# Patient Record
Sex: FEMALE | Race: ASIAN | NOT HISPANIC OR LATINO
[De-identification: names, ages, dates, MRNs, and addresses within clinical notes are randomized per-mention and may not be internally consistent; named-entity substitution may affect disease eponyms.]

---

## 2020-04-26 ENCOUNTER — MESSAGE (OUTPATIENT)
Age: 25
End: 2020-04-26

## 2020-05-14 LAB
SARS-COV-2 IGG SERPL IA-ACNC: 0.1 INDEX
SARS-COV-2 IGG SERPL QL IA: NEGATIVE

## 2022-01-31 ENCOUNTER — RESULT REVIEW (OUTPATIENT)
Age: 27
End: 2022-01-31

## 2022-04-26 PROBLEM — Z00.00 ENCOUNTER FOR PREVENTIVE HEALTH EXAMINATION: Status: ACTIVE | Noted: 2022-04-26

## 2022-04-27 ENCOUNTER — ASOB RESULT (OUTPATIENT)
Age: 27
End: 2022-04-27

## 2022-04-27 ENCOUNTER — APPOINTMENT (OUTPATIENT)
Dept: ANTEPARTUM | Facility: CLINIC | Age: 27
End: 2022-04-27
Payer: COMMERCIAL

## 2022-04-27 PROCEDURE — 76811 OB US DETAILED SNGL FETUS: CPT

## 2022-08-23 ENCOUNTER — OUTPATIENT (OUTPATIENT)
Dept: OUTPATIENT SERVICES | Facility: HOSPITAL | Age: 27
LOS: 1 days | End: 2022-08-23
Payer: COMMERCIAL

## 2022-08-23 VITALS
OXYGEN SATURATION: 98 % | HEART RATE: 88 BPM | SYSTOLIC BLOOD PRESSURE: 104 MMHG | WEIGHT: 127.87 LBS | DIASTOLIC BLOOD PRESSURE: 72 MMHG | HEIGHT: 64 IN | RESPIRATION RATE: 16 BRPM | TEMPERATURE: 98 F

## 2022-08-23 DIAGNOSIS — Z01.818 ENCOUNTER FOR OTHER PREPROCEDURAL EXAMINATION: ICD-10-CM

## 2022-08-23 DIAGNOSIS — Z34.90 ENCOUNTER FOR SUPERVISION OF NORMAL PREGNANCY, UNSPECIFIED, UNSPECIFIED TRIMESTER: ICD-10-CM

## 2022-08-23 DIAGNOSIS — Z33.1 PREGNANT STATE, INCIDENTAL: ICD-10-CM

## 2022-08-23 DIAGNOSIS — Z29.9 ENCOUNTER FOR PROPHYLACTIC MEASURES, UNSPECIFIED: ICD-10-CM

## 2022-08-23 LAB
HCT VFR BLD CALC: 41.6 % — SIGNIFICANT CHANGE UP (ref 34.5–45)
HGB BLD-MCNC: 13.8 G/DL — SIGNIFICANT CHANGE UP (ref 11.5–15.5)
MCHC RBC-ENTMCNC: 30.9 PG — SIGNIFICANT CHANGE UP (ref 27–34)
MCHC RBC-ENTMCNC: 33.2 GM/DL — SIGNIFICANT CHANGE UP (ref 32–36)
MCV RBC AUTO: 93.1 FL — SIGNIFICANT CHANGE UP (ref 80–100)
NRBC # BLD: 0 /100 WBCS — SIGNIFICANT CHANGE UP (ref 0–0)
PLATELET # BLD AUTO: 206 K/UL — SIGNIFICANT CHANGE UP (ref 150–400)
RBC # BLD: 4.47 M/UL — SIGNIFICANT CHANGE UP (ref 3.8–5.2)
RBC # FLD: 13.4 % — SIGNIFICANT CHANGE UP (ref 10.3–14.5)
RH IG SCN BLD-IMP: POSITIVE — SIGNIFICANT CHANGE UP
WBC # BLD: 9.3 K/UL — SIGNIFICANT CHANGE UP (ref 3.8–10.5)
WBC # FLD AUTO: 9.3 K/UL — SIGNIFICANT CHANGE UP (ref 3.8–10.5)

## 2022-08-23 PROCEDURE — 86850 RBC ANTIBODY SCREEN: CPT

## 2022-08-23 PROCEDURE — 86900 BLOOD TYPING SEROLOGIC ABO: CPT

## 2022-08-23 PROCEDURE — 36415 COLL VENOUS BLD VENIPUNCTURE: CPT

## 2022-08-23 PROCEDURE — G0463: CPT

## 2022-08-23 PROCEDURE — 85027 COMPLETE CBC AUTOMATED: CPT

## 2022-08-23 PROCEDURE — 86901 BLOOD TYPING SEROLOGIC RH(D): CPT

## 2022-08-23 NOTE — OB PST NOTE - NSANTHOSAYNRD_GEN_A_CORE
No. CHIO screening performed.  STOP BANG Legend: 0-2 = LOW Risk; 3-4 = INTERMEDIATE Risk; 5-8 = HIGH Risk

## 2022-08-23 NOTE — OB PST NOTE - NSHPPHYSICALEXAM_GEN_ALL_CORE
Constitutional: well developed, well nourished,  and no acute distress    Neurological: Alert & Oriented x 3,  no focal deficits    HEENT:   Neck supple.    Respiratory: CTA B/L, No wheezing/crackles/rhonchi    Cardiovascular: (+) S1 & S2, RRR    Gastrointestinal:  (+) BS    Genitourinary: non distended bladder, voiding freely    Extremities: No pedal edema    Skin:  normal skin color

## 2022-08-23 NOTE — OB PST NOTE - ASSESSMENT
BRAULIOI VTE 2.0 SCORE [CLOT updated 2019]    AGE RELATED RISK FACTORS                                                       MOBILITY RELATED FACTORS  [ ] Age 41-60 years                                            (1 Point)                    [ ] Bed rest                                                        (1 Point)  [ ] Age: 61-74 years                                           (2 Points)                  [ ] Plaster cast                                                   (2 Points)  [ ] Age= 75 years                                              (3 Points)                    [ ] Bed bound for more than 72 hours                 (2 Points)    DISEASE RELATED RISK FACTORS                                               GENDER SPECIFIC FACTORS  [ ] Edema in the lower extremities                       (1 Point)              [ x] Pregnancy                                                     (1 Point)  [ ] Varicose veins                                               (1 Point)                     [ ] Post-partum < 6 weeks                                   (1 Point)             [ ] BMI > 25 Kg/m2                                            (1 Point)                     [ ] Hormonal therapy  or oral contraception          (1 Point)                 [ ] Sepsis (in the previous month)                        (1 Point)               [ ] History of pregnancy complications                 (1 point)  [ ] Pneumonia or serious lung disease                                               [ ] Unexplained or recurrent                     (1 Point)           (in the previous month)                               (1 Point)  [ ] Abnormal pulmonary function test                     (1 Point)                 SURGERY RELATED RISK FACTORS  [ ] Acute myocardial infarction                              (1 Point)               [ x]  Section                                             (1 Point)  [ ] Congestive heart failure (in the previous month)  (1 Point)      [ ] Minor surgery                                                  (1 Point)   [ ] Inflammatory bowel disease                             (1 Point)               [ ] Arthroscopic surgery                                        (2 Points)  [ ] Central venous access                                      (2 Points)                [ ] General surgery lasting more than 45 minutes (2 points)  [ ] Malignancy- Present or previous                   (2 Points)                [ ] Elective arthroplasty                                         (5 points)    [ ] Stroke (in the previous month)                          (5 Points)                                                                                                                                                           HEMATOLOGY RELATED FACTORS                                                 TRAUMA RELATED RISK FACTORS  [ ] Prior episodes of VTE                                     (3 Points)                [ ] Fracture of the hip, pelvis, or leg                       (5 Points)  [ ] Positive family history for VTE                         (3 Points)             [ ] Acute spinal cord injury (in the previous month)  (5 Points)  [ ] Prothrombin 69141 A                                     (3 Points)               [ ] Paralysis  (less than 1 month)                             (5 Points)  [ ] Factor V Leiden                                             (3 Points)                  [ ] Multiple Trauma within 1 month                        (5 Points)  [ ] Lupus anticoagulants                                     (3 Points)                                                           [ ] Anticardiolipin antibodies                               (3 Points)                                                       [ ] High homocysteine in the blood                      (3 Points)                                             [ ] Other congenital or acquired thrombophilia      (3 Points)                                                [ ] Heparin induced thrombocytopenia                  (3 Points)                                     Total Score [  2        ]

## 2022-08-23 NOTE — OB PST NOTE - FALL HARM RISK - UNIVERSAL INTERVENTIONS
Bed in lowest position, wheels locked, appropriate side rails in place/Call bell, personal items and telephone in reach/Instruct patient to call for assistance before getting out of bed or chair/Non-slip footwear when patient is out of bed/[place] to call system/Physically safe environment - no spills, clutter or unnecessary equipment/Purposeful Proactive Rounding/Room/bathroom lighting operational, light cord in reach

## 2022-08-23 NOTE — OB PST NOTE - HISTORY OF PRESENT ILLNESS
27 year old female  term pregnancy with breech presentation planned for Primary  on 2022 ( EDC 2022)      ***Covid test 9/3 Near home  Covid 2022 mild symptoms, follow Dr. Joseph covid swab instructions   patient will bring pharmacy info the DOS

## 2022-08-24 LAB — BLD GP AB SCN SERPL QL: NEGATIVE — SIGNIFICANT CHANGE UP

## 2022-08-26 PROBLEM — Z34.90 ENCOUNTER FOR SUPERVISION OF NORMAL PREGNANCY, UNSPECIFIED, UNSPECIFIED TRIMESTER: Chronic | Status: ACTIVE | Noted: 2022-08-23

## 2022-09-02 ENCOUNTER — TRANSCRIPTION ENCOUNTER (OUTPATIENT)
Age: 27
End: 2022-09-02

## 2022-09-03 ENCOUNTER — INPATIENT (INPATIENT)
Facility: HOSPITAL | Age: 27
LOS: 1 days | Discharge: HOME | End: 2022-09-05
Attending: OBSTETRICS & GYNECOLOGY | Admitting: OBSTETRICS & GYNECOLOGY
Payer: COMMERCIAL

## 2022-09-03 VITALS
HEART RATE: 80 BPM | RESPIRATION RATE: 17 BRPM | SYSTOLIC BLOOD PRESSURE: 140 MMHG | TEMPERATURE: 98 F | OXYGEN SATURATION: 99 % | DIASTOLIC BLOOD PRESSURE: 83 MMHG

## 2022-09-03 DIAGNOSIS — O26.899 OTHER SPECIFIED PREGNANCY RELATED CONDITIONS, UNSPECIFIED TRIMESTER: ICD-10-CM

## 2022-09-03 DIAGNOSIS — Z3A.00 WEEKS OF GESTATION OF PREGNANCY NOT SPECIFIED: ICD-10-CM

## 2022-09-03 DIAGNOSIS — Z34.80 ENCOUNTER FOR SUPERVISION OF OTHER NORMAL PREGNANCY, UNSPECIFIED TRIMESTER: ICD-10-CM

## 2022-09-03 LAB
ALBUMIN SERPL ELPH-MCNC: 4 G/DL — SIGNIFICANT CHANGE UP (ref 3.3–5)
ALP SERPL-CCNC: 143 U/L — HIGH (ref 40–120)
ALT FLD-CCNC: 14 U/L — SIGNIFICANT CHANGE UP (ref 10–45)
ANION GAP SERPL CALC-SCNC: 12 MMOL/L — SIGNIFICANT CHANGE UP (ref 5–17)
APTT BLD: 25.6 SEC — LOW (ref 27.5–35.5)
AST SERPL-CCNC: 15 U/L — SIGNIFICANT CHANGE UP (ref 10–40)
BASOPHILS # BLD AUTO: 0.08 K/UL — SIGNIFICANT CHANGE UP (ref 0–0.2)
BASOPHILS NFR BLD AUTO: 0.8 % — SIGNIFICANT CHANGE UP (ref 0–2)
BILIRUB SERPL-MCNC: 0.4 MG/DL — SIGNIFICANT CHANGE UP (ref 0.2–1.2)
BLD GP AB SCN SERPL QL: NEGATIVE — SIGNIFICANT CHANGE UP
BUN SERPL-MCNC: 11 MG/DL — SIGNIFICANT CHANGE UP (ref 7–23)
CALCIUM SERPL-MCNC: 9.3 MG/DL — SIGNIFICANT CHANGE UP (ref 8.4–10.5)
CHLORIDE SERPL-SCNC: 104 MMOL/L — SIGNIFICANT CHANGE UP (ref 96–108)
CO2 SERPL-SCNC: 21 MMOL/L — LOW (ref 22–31)
COVID-19 SPIKE DOMAIN AB INTERP: POSITIVE
COVID-19 SPIKE DOMAIN ANTIBODY RESULT: >250 U/ML — HIGH
CREAT SERPL-MCNC: 0.5 MG/DL — SIGNIFICANT CHANGE UP (ref 0.5–1.3)
EGFR: 130 ML/MIN/1.73M2 — SIGNIFICANT CHANGE UP
EOSINOPHIL # BLD AUTO: 0.16 K/UL — SIGNIFICANT CHANGE UP (ref 0–0.5)
EOSINOPHIL NFR BLD AUTO: 1.5 % — SIGNIFICANT CHANGE UP (ref 0–6)
FIBRINOGEN PPP-MCNC: 540 MG/DL — HIGH (ref 330–520)
GLUCOSE SERPL-MCNC: 86 MG/DL — SIGNIFICANT CHANGE UP (ref 70–99)
HCT VFR BLD CALC: 42.5 % — SIGNIFICANT CHANGE UP (ref 34.5–45)
HGB BLD-MCNC: 14.1 G/DL — SIGNIFICANT CHANGE UP (ref 11.5–15.5)
IMM GRANULOCYTES NFR BLD AUTO: 1.1 % — SIGNIFICANT CHANGE UP (ref 0–1.5)
INR BLD: 0.88 RATIO — SIGNIFICANT CHANGE UP (ref 0.88–1.16)
LDH SERPL L TO P-CCNC: 164 U/L — SIGNIFICANT CHANGE UP (ref 50–242)
LYMPHOCYTES # BLD AUTO: 19.5 % — SIGNIFICANT CHANGE UP (ref 13–44)
LYMPHOCYTES # BLD AUTO: 2.05 K/UL — SIGNIFICANT CHANGE UP (ref 1–3.3)
MCHC RBC-ENTMCNC: 31.1 PG — SIGNIFICANT CHANGE UP (ref 27–34)
MCHC RBC-ENTMCNC: 33.2 GM/DL — SIGNIFICANT CHANGE UP (ref 32–36)
MCV RBC AUTO: 93.6 FL — SIGNIFICANT CHANGE UP (ref 80–100)
MONOCYTES # BLD AUTO: 0.97 K/UL — HIGH (ref 0–0.9)
MONOCYTES NFR BLD AUTO: 9.2 % — SIGNIFICANT CHANGE UP (ref 2–14)
NEUTROPHILS # BLD AUTO: 7.11 K/UL — SIGNIFICANT CHANGE UP (ref 1.8–7.4)
NEUTROPHILS NFR BLD AUTO: 67.9 % — SIGNIFICANT CHANGE UP (ref 43–77)
NRBC # BLD: 0 /100 WBCS — SIGNIFICANT CHANGE UP (ref 0–0)
PLATELET # BLD AUTO: 187 K/UL — SIGNIFICANT CHANGE UP (ref 150–400)
POTASSIUM SERPL-MCNC: 3.9 MMOL/L — SIGNIFICANT CHANGE UP (ref 3.5–5.3)
POTASSIUM SERPL-SCNC: 3.9 MMOL/L — SIGNIFICANT CHANGE UP (ref 3.5–5.3)
PROT SERPL-MCNC: 7 G/DL — SIGNIFICANT CHANGE UP (ref 6–8.3)
PROTHROM AB SERPL-ACNC: 10.2 SEC — LOW (ref 10.5–13.4)
RBC # BLD: 4.54 M/UL — SIGNIFICANT CHANGE UP (ref 3.8–5.2)
RBC # FLD: 13.2 % — SIGNIFICANT CHANGE UP (ref 10.3–14.5)
RH IG SCN BLD-IMP: POSITIVE — SIGNIFICANT CHANGE UP
SARS-COV-2 IGG+IGM SERPL QL IA: >250 U/ML — HIGH
SARS-COV-2 IGG+IGM SERPL QL IA: POSITIVE
SODIUM SERPL-SCNC: 137 MMOL/L — SIGNIFICANT CHANGE UP (ref 135–145)
T PALLIDUM AB TITR SER: NEGATIVE — SIGNIFICANT CHANGE UP
URATE SERPL-MCNC: 4.9 MG/DL — SIGNIFICANT CHANGE UP (ref 2.5–7)
WBC # BLD: 10.49 K/UL — SIGNIFICANT CHANGE UP (ref 3.8–10.5)
WBC # FLD AUTO: 10.49 K/UL — SIGNIFICANT CHANGE UP (ref 3.8–10.5)

## 2022-09-03 PROCEDURE — 59514 CESAREAN DELIVERY ONLY: CPT | Mod: AS,U9

## 2022-09-03 DEVICE — INTERCEED 5 X 6" XL: Type: IMPLANTABLE DEVICE | Status: FUNCTIONAL

## 2022-09-03 RX ORDER — KETOROLAC TROMETHAMINE 30 MG/ML
30 SYRINGE (ML) INJECTION EVERY 6 HOURS
Refills: 0 | Status: COMPLETED | OUTPATIENT
Start: 2022-09-03 | End: 2022-09-05

## 2022-09-03 RX ORDER — HEPARIN SODIUM 5000 [USP'U]/ML
5000 INJECTION INTRAVENOUS; SUBCUTANEOUS EVERY 12 HOURS
Refills: 0 | Status: DISCONTINUED | OUTPATIENT
Start: 2022-09-03 | End: 2022-09-05

## 2022-09-03 RX ORDER — MAGNESIUM HYDROXIDE 400 MG/1
30 TABLET, CHEWABLE ORAL
Refills: 0 | Status: DISCONTINUED | OUTPATIENT
Start: 2022-09-03 | End: 2022-09-05

## 2022-09-03 RX ORDER — OXYCODONE HYDROCHLORIDE 5 MG/1
5 TABLET ORAL
Refills: 0 | Status: COMPLETED | OUTPATIENT
Start: 2022-09-03 | End: 2022-09-10

## 2022-09-03 RX ORDER — MORPHINE SULFATE 50 MG/1
0.1 CAPSULE, EXTENDED RELEASE ORAL ONCE
Refills: 0 | Status: DISCONTINUED | OUTPATIENT
Start: 2022-09-03 | End: 2022-09-04

## 2022-09-03 RX ORDER — FAMOTIDINE 10 MG/ML
20 INJECTION INTRAVENOUS ONCE
Refills: 0 | Status: COMPLETED | OUTPATIENT
Start: 2022-09-03 | End: 2022-09-03

## 2022-09-03 RX ORDER — SODIUM CHLORIDE 9 MG/ML
1000 INJECTION, SOLUTION INTRAVENOUS
Refills: 0 | Status: DISCONTINUED | OUTPATIENT
Start: 2022-09-03 | End: 2022-09-05

## 2022-09-03 RX ORDER — ACETAMINOPHEN 500 MG
975 TABLET ORAL
Refills: 0 | Status: DISCONTINUED | OUTPATIENT
Start: 2022-09-03 | End: 2022-09-05

## 2022-09-03 RX ORDER — IBUPROFEN 200 MG
600 TABLET ORAL EVERY 6 HOURS
Refills: 0 | Status: COMPLETED | OUTPATIENT
Start: 2022-09-03 | End: 2023-08-02

## 2022-09-03 RX ORDER — OXYCODONE HYDROCHLORIDE 5 MG/1
5 TABLET ORAL ONCE
Refills: 0 | Status: DISCONTINUED | OUTPATIENT
Start: 2022-09-03 | End: 2022-09-05

## 2022-09-03 RX ORDER — SODIUM CHLORIDE 9 MG/ML
1000 INJECTION, SOLUTION INTRAVENOUS
Refills: 0 | Status: DISCONTINUED | OUTPATIENT
Start: 2022-09-03 | End: 2022-09-03

## 2022-09-03 RX ORDER — ONDANSETRON 8 MG/1
4 TABLET, FILM COATED ORAL EVERY 6 HOURS
Refills: 0 | Status: DISCONTINUED | OUTPATIENT
Start: 2022-09-03 | End: 2022-09-04

## 2022-09-03 RX ORDER — DEXAMETHASONE 0.5 MG/5ML
4 ELIXIR ORAL EVERY 6 HOURS
Refills: 0 | Status: DISCONTINUED | OUTPATIENT
Start: 2022-09-03 | End: 2022-09-04

## 2022-09-03 RX ORDER — NALBUPHINE HYDROCHLORIDE 10 MG/ML
2.5 INJECTION, SOLUTION INTRAMUSCULAR; INTRAVENOUS; SUBCUTANEOUS EVERY 6 HOURS
Refills: 0 | Status: DISCONTINUED | OUTPATIENT
Start: 2022-09-03 | End: 2022-09-04

## 2022-09-03 RX ORDER — SIMETHICONE 80 MG/1
80 TABLET, CHEWABLE ORAL EVERY 4 HOURS
Refills: 0 | Status: DISCONTINUED | OUTPATIENT
Start: 2022-09-03 | End: 2022-09-05

## 2022-09-03 RX ORDER — LANOLIN
1 OINTMENT (GRAM) TOPICAL EVERY 6 HOURS
Refills: 0 | Status: DISCONTINUED | OUTPATIENT
Start: 2022-09-03 | End: 2022-09-05

## 2022-09-03 RX ORDER — TETANUS TOXOID, REDUCED DIPHTHERIA TOXOID AND ACELLULAR PERTUSSIS VACCINE, ADSORBED 5; 2.5; 8; 8; 2.5 [IU]/.5ML; [IU]/.5ML; UG/.5ML; UG/.5ML; UG/.5ML
0.5 SUSPENSION INTRAMUSCULAR ONCE
Refills: 0 | Status: DISCONTINUED | OUTPATIENT
Start: 2022-09-03 | End: 2022-09-05

## 2022-09-03 RX ORDER — OXYTOCIN 10 UNIT/ML
333.33 VIAL (ML) INJECTION
Qty: 20 | Refills: 0 | Status: DISCONTINUED | OUTPATIENT
Start: 2022-09-03 | End: 2022-09-03

## 2022-09-03 RX ORDER — CITRIC ACID/SODIUM CITRATE 300-500 MG
30 SOLUTION, ORAL ORAL ONCE
Refills: 0 | Status: COMPLETED | OUTPATIENT
Start: 2022-09-03 | End: 2022-09-03

## 2022-09-03 RX ORDER — SODIUM CHLORIDE 9 MG/ML
1000 INJECTION, SOLUTION INTRAVENOUS ONCE
Refills: 0 | Status: COMPLETED | OUTPATIENT
Start: 2022-09-03 | End: 2022-09-03

## 2022-09-03 RX ORDER — CEFAZOLIN SODIUM 1 G
2000 VIAL (EA) INJECTION ONCE
Refills: 0 | Status: DISCONTINUED | OUTPATIENT
Start: 2022-09-03 | End: 2022-09-03

## 2022-09-03 RX ORDER — OXYTOCIN 10 UNIT/ML
333.33 VIAL (ML) INJECTION
Qty: 20 | Refills: 0 | Status: DISCONTINUED | OUTPATIENT
Start: 2022-09-03 | End: 2022-09-05

## 2022-09-03 RX ORDER — DIPHENHYDRAMINE HCL 50 MG
25 CAPSULE ORAL EVERY 6 HOURS
Refills: 0 | Status: DISCONTINUED | OUTPATIENT
Start: 2022-09-03 | End: 2022-09-05

## 2022-09-03 RX ORDER — NALOXONE HYDROCHLORIDE 4 MG/.1ML
0.1 SPRAY NASAL
Refills: 0 | Status: DISCONTINUED | OUTPATIENT
Start: 2022-09-03 | End: 2022-09-04

## 2022-09-03 RX ORDER — INFLUENZA VIRUS VACCINE 15; 15; 15; 15 UG/.5ML; UG/.5ML; UG/.5ML; UG/.5ML
0.5 SUSPENSION INTRAMUSCULAR ONCE
Refills: 0 | Status: COMPLETED | OUTPATIENT
Start: 2022-09-03 | End: 2022-09-04

## 2022-09-03 RX ADMIN — Medication 30 MILLILITER(S): at 09:13

## 2022-09-03 RX ADMIN — Medication 30 MILLIGRAM(S): at 17:28

## 2022-09-03 RX ADMIN — HEPARIN SODIUM 5000 UNIT(S): 5000 INJECTION INTRAVENOUS; SUBCUTANEOUS at 17:28

## 2022-09-03 RX ADMIN — SODIUM CHLORIDE 2000 MILLILITER(S): 9 INJECTION, SOLUTION INTRAVENOUS at 09:13

## 2022-09-03 RX ADMIN — FAMOTIDINE 20 MILLIGRAM(S): 10 INJECTION INTRAVENOUS at 09:13

## 2022-09-03 RX ADMIN — Medication 975 MILLIGRAM(S): at 20:34

## 2022-09-03 RX ADMIN — Medication 1000 MILLIUNIT(S)/MIN: at 11:41

## 2022-09-03 RX ADMIN — Medication 975 MILLIGRAM(S): at 21:10

## 2022-09-03 RX ADMIN — Medication 975 MILLIGRAM(S): at 15:04

## 2022-09-03 NOTE — OB PROVIDER H&P - PRO PRENATAL LABS ORI SOURCE BLOOD TYPE
Patient with concerns of mass to right neck.     --Neck US today. F/u results  --Scheduled PET scan. F/u with Dr. Solo following PET to discuss results  
SCM

## 2022-09-03 NOTE — BRIEF OPERATIVE NOTE - OPERATION/FINDINGS
unscheduled pLTCS for breech presentation  Viable male infant, apgars 9/9  Hysterotomy closed in 2 layers using PDS  Intercede placed overtop the hysterotomy  Grossly normal uterus, tubes, and ovaries  abd. closed in standard fashion  Patient and infant to recovery in stable fashion  QBL: 364  IV: 1000  urine: 800

## 2022-09-03 NOTE — OB RN DELIVERY SUMMARY - NSSELHIDDEN_OBGYN_ALL_OB_FT
[NS_DeliveryAttending1_OBGYN_ALL_OB_FT:[id number]],[NS_DeliveryAssist1_OBGYN_ALL_OB_FT:[id number]==],[NS_DeliveryRN_OBGYN_ALL_OB_FT:[id number]==],[NS_CirculateRN2_OBGYN_ALL_OB_FT:[id number]=]

## 2022-09-03 NOTE — OB RN INTRAOPERATIVE NOTE - NS_FROZENSECTION_OBGYN_ALL_OB
Ventricular Rate : 75   Atrial Rate : 75   P-R Interval : 160   QRS Duration : 84   Q-T Interval : 370   QTC Calculation(Bezet) : 413   P Axis : 24   R Axis : 12   T Axis : 27   Diagnosis : Normal sinus rhythm~Cannot rule out Anterior infarct , age undetermined~Abnormal ECG~No previous ECGs available~Confirmed by VIK CAMPOS DO ([id number]) on 3/30/2017 7:45:23 AM     
No

## 2022-09-03 NOTE — OB PROVIDER H&P - NSHPPHYSICALEXAM_GEN_ALL_CORE
Vital Signs Last 24 Hrs  T(C): 36.7 (03 Sep 2022 08:22), Max: 36.7 (03 Sep 2022 08:07)  T(F): 98.1 (03 Sep 2022 08:22), Max: 98.1 (03 Sep 2022 08:17)  HR: 78 (03 Sep 2022 08:52) (77 - 87)  BP: 117/88 (03 Sep 2022 08:38) (117/88 - 140/83)  RR: 17 (03 Sep 2022 08:22) (17 - 17)  SpO2: 98% (03 Sep 2022 08:52) (98% - 99%)    Parameters below as of 03 Sep 2022 08:22  Patient On (Oxygen Delivery Method): room air    Gen: NAD, A&O  CV:RRR  Pulm: CTA bl  SVE: 0/0/-3  SSE: pooling+, nitrazine+  FHT: 130, mod matheus, + accels, - decels TERRANCE  TOCO:  uterine irritability  Sono: vimal breech, head to maternal right, fundal placenta

## 2022-09-03 NOTE — OB PROVIDER H&P - HISTORY OF PRESENT ILLNESS
28yo  @38w5d p/f ROR. +LOF since 7:30am. Patient was scheduled for pLTCS on  for breech presentation. –VB, -Ctx, +FM. Denies fever, chills, nausea, vomiting, diarrhea, headache, constipation, dizziness, syncope, chest pain, palpitations, shortness of breath, dysuria, urgency, frequency.  PNC: unc  GBS: neg  EFW: 2980  ObHx: denies  GynHx: denies  MedHx: denies  PSHx: denies  PsychHx: denies  SocialHx: denies  AllergyHx: shrimp (rash)  RxHx: PNV

## 2022-09-03 NOTE — OB NEONATOLOGY/PEDIATRICIAN DELIVERY SUMMARY - NSPEDSNEONOTESA_OBGYN_ALL_OB_FT
Peds called to LDR/OR for breech presentatio 38.5 wk AGA male born via CS to a 26 y/o  mother.  No significant maternal or prenatal history. Maternal labs include Blood Type A+ , HIV - , RPR NR , Rubella I , Hep B - , GBS- /, COVID-. ROM at 730 on 9/3 with clear fluids (ROM hours: 2H08M).  Baby emerged vigorous, crying, was w/d/s/s with APGARS of 9/9 . Nuchal x1. Resuscitation included: suction of oropharynx and nose . Mom plans to initiate breastfeeding and formula feed.  Highest maternal temp: 36.7. EOS 0.06.     Physical Exam:  Gen: no acute distress, +grimace  HEENT:  anterior fontanel open soft and flat, nondysmoprhic facies, no cleft lip/palate, ears normal set, no ear pits or tags, nares clinically patent  Resp: Normal respiratory effort without grunting or retractions, good air entry b/l, clear to auscultation bilaterally  Cardio: Present S1/S2, regular rate and rhythm, no murmurs  Abd: soft, non tender, non distended, umbilical cord with 3 vessels  Neuro: +palmar and plantar grasp, +suck, +beth, normal tone  Extremities: negative cobb and ortolani maneuvers, moving all extremities, no clavicular crepitus or stepoff  Skin: pink, warm  Genitals: Normal male anatomy, testicles palpable in scrotum b/l, Fili 1, anus patent

## 2022-09-03 NOTE — OB PROVIDER DELIVERY SUMMARY - NSSELHIDDEN_OBGYN_ALL_OB_FT
[NS_DeliveryAttending1_OBGYN_ALL_OB_FT:[id number]],[NS_DeliveryAssist1_OBGYN_ALL_OB_FT:[id number]],[NS_DeliveryRN_OBGYN_ALL_OB_FT:[id number]==],[NS_CirculateRN2_OBGYN_ALL_OB_FT:[id number]=]

## 2022-09-03 NOTE — OB PROVIDER H&P - ASSESSMENT
28yo  @38w5d p/f PROM@7:30am. On exam, patient noted to be grossly ruptured with +pooling and +nitrazine. NST reactive, VS snl. Given breech presentation, admit for pLTCS.   - admit to L&D  - GBS neg  - EFW 2980  - Reglan/Pepcid/Bicitra  - Routine Labs  - FHT/TOCO  - Anesthesia Consult  - for LTCS    D/w Dr. Meme Guaman, PGY-1   28yo  @38w5d p/f PROM@7:30am. On exam, patient noted to be grossly ruptured with +pooling and +nitrazine. NST reactive. Patient noted to have some mild range pressures in triage (121-140/83-92), denies sxs of PEC, HELLP labs drawn. Given breech presentation, admit for pLTCS.   - admit to L&D  - GBS neg  - EFW 2980  - Reglan/Pepcid/Bicitra  - Routine Labs  - FHT/TOCO  - Anesthesia Consult  - for pLTCS for breech presentation  - f/u HELLP labs, close monitoring of BPs and for severe symptoms    D/w Dr. Meme Guaman, PGY-1

## 2022-09-03 NOTE — OB RN INTRAOPERATIVE NOTE - NSSELHIDDEN_OBGYN_ALL_OB_FT
[NS_DeliveryAttending1_OBGYN_ALL_OB_FT:[id number]],[NS_DeliveryAssist1_OBGYN_ALL_OB_FT:[id number]],[NS_DeliveryRN_OBGYN_ALL_OB_FT:[id number]==],[NS_CirculateRN2_OBGYN_ALL_OB_FT:[id number]=]

## 2022-09-03 NOTE — OB PROVIDER DELIVERY SUMMARY - NSPROVIDERDELIVERYNOTE_OBGYN_ALL_OB_FT
unscheduled pLTCS for breech presentation  Viable male infant, apgars 9/9  Hysterotomy closed in 2 layers using PDS  Intercede placed overtop the hysterotomy  Grossly normal uterus, tubes, and ovaries  abd. closed in standard fashion  Patient and infant to recovery in stable fashion  QBL: 364  IV: 1000  urine: 800    Dictation: [id number]

## 2022-09-04 ENCOUNTER — TRANSCRIPTION ENCOUNTER (OUTPATIENT)
Age: 27
End: 2022-09-04

## 2022-09-04 LAB
BASOPHILS # BLD AUTO: 0.07 K/UL — SIGNIFICANT CHANGE UP (ref 0–0.2)
BASOPHILS NFR BLD AUTO: 0.4 % — SIGNIFICANT CHANGE UP (ref 0–2)
EOSINOPHIL # BLD AUTO: 0.06 K/UL — SIGNIFICANT CHANGE UP (ref 0–0.5)
EOSINOPHIL NFR BLD AUTO: 0.3 % — SIGNIFICANT CHANGE UP (ref 0–6)
HCT VFR BLD CALC: 36.2 % — SIGNIFICANT CHANGE UP (ref 34.5–45)
HGB BLD-MCNC: 12.1 G/DL — SIGNIFICANT CHANGE UP (ref 11.5–15.5)
IMM GRANULOCYTES NFR BLD AUTO: 0.8 % — SIGNIFICANT CHANGE UP (ref 0–1.5)
LYMPHOCYTES # BLD AUTO: 1.77 K/UL — SIGNIFICANT CHANGE UP (ref 1–3.3)
LYMPHOCYTES # BLD AUTO: 10.1 % — LOW (ref 13–44)
MCHC RBC-ENTMCNC: 30.9 PG — SIGNIFICANT CHANGE UP (ref 27–34)
MCHC RBC-ENTMCNC: 33.4 GM/DL — SIGNIFICANT CHANGE UP (ref 32–36)
MCV RBC AUTO: 92.6 FL — SIGNIFICANT CHANGE UP (ref 80–100)
MONOCYTES # BLD AUTO: 1.21 K/UL — HIGH (ref 0–0.9)
MONOCYTES NFR BLD AUTO: 6.9 % — SIGNIFICANT CHANGE UP (ref 2–14)
NEUTROPHILS # BLD AUTO: 14.19 K/UL — HIGH (ref 1.8–7.4)
NEUTROPHILS NFR BLD AUTO: 81.5 % — HIGH (ref 43–77)
NRBC # BLD: 0 /100 WBCS — SIGNIFICANT CHANGE UP (ref 0–0)
PLATELET # BLD AUTO: 178 K/UL — SIGNIFICANT CHANGE UP (ref 150–400)
RBC # BLD: 3.91 M/UL — SIGNIFICANT CHANGE UP (ref 3.8–5.2)
RBC # FLD: 13.2 % — SIGNIFICANT CHANGE UP (ref 10.3–14.5)
WBC # BLD: 17.44 K/UL — HIGH (ref 3.8–10.5)
WBC # FLD AUTO: 17.44 K/UL — HIGH (ref 3.8–10.5)

## 2022-09-04 RX ORDER — IBUPROFEN 200 MG
600 TABLET ORAL EVERY 6 HOURS
Refills: 0 | Status: DISCONTINUED | OUTPATIENT
Start: 2022-09-04 | End: 2022-09-05

## 2022-09-04 RX ADMIN — Medication 600 MILLIGRAM(S): at 18:25

## 2022-09-04 RX ADMIN — Medication 30 MILLIGRAM(S): at 05:53

## 2022-09-04 RX ADMIN — Medication 975 MILLIGRAM(S): at 22:33

## 2022-09-04 RX ADMIN — HEPARIN SODIUM 5000 UNIT(S): 5000 INJECTION INTRAVENOUS; SUBCUTANEOUS at 05:53

## 2022-09-04 RX ADMIN — Medication 30 MILLIGRAM(S): at 00:08

## 2022-09-04 RX ADMIN — Medication 975 MILLIGRAM(S): at 21:27

## 2022-09-04 RX ADMIN — Medication 600 MILLIGRAM(S): at 23:56

## 2022-09-04 RX ADMIN — Medication 975 MILLIGRAM(S): at 09:00

## 2022-09-04 RX ADMIN — Medication 30 MILLIGRAM(S): at 12:05

## 2022-09-04 RX ADMIN — Medication 975 MILLIGRAM(S): at 15:00

## 2022-09-04 RX ADMIN — Medication 30 MILLIGRAM(S): at 12:50

## 2022-09-04 RX ADMIN — Medication 975 MILLIGRAM(S): at 02:51

## 2022-09-04 RX ADMIN — Medication 30 MILLIGRAM(S): at 00:45

## 2022-09-04 RX ADMIN — Medication 975 MILLIGRAM(S): at 03:30

## 2022-09-04 RX ADMIN — HEPARIN SODIUM 5000 UNIT(S): 5000 INJECTION INTRAVENOUS; SUBCUTANEOUS at 18:26

## 2022-09-04 RX ADMIN — Medication 975 MILLIGRAM(S): at 15:45

## 2022-09-04 RX ADMIN — Medication 975 MILLIGRAM(S): at 09:45

## 2022-09-04 RX ADMIN — INFLUENZA VIRUS VACCINE 0.5 MILLILITER(S): 15; 15; 15; 15 SUSPENSION INTRAMUSCULAR at 21:27

## 2022-09-04 RX ADMIN — Medication 600 MILLIGRAM(S): at 18:55

## 2022-09-04 NOTE — DISCHARGE NOTE OB - MEDICATION SUMMARY - MEDICATIONS TO TAKE
I will START or STAY ON the medications listed below when I get home from the hospital:    prenatal vitamin  -- once a day  -- Indication: For Postpartum    acetaminophen 325 mg oral tablet  -- 2 tab(s) by mouth every 6 hours  -- Indication: For Pain    ibuprofen 600 mg oral tablet  -- 1 tab(s) by mouth every 6 hours  -- Indication: For Pain    oxyCODONE 5 mg oral tablet  -- 1 tab(s) by mouth every 4 to 6 hours, As Needed -Moderate to Severe Pain (4-10) - for agitation MDD:1-2 tablets every 4 -6 hours as needed  -- Indication: For mod pain

## 2022-09-04 NOTE — DISCHARGE NOTE OB - MATERIALS PROVIDED
Guide to Postpartum Care/[facility] Hearing Screen Program/Back To Sleep Handout/Shaken Baby Prevention Handout/Breastfeeding Guide and Packet

## 2022-09-04 NOTE — PROGRESS NOTE ADULT - SUBJECTIVE AND OBJECTIVE BOX
Day _1__ of Anesthesia Pain Management Service    SUBJECTIVE:  Pain Scale Score	At rest: ___ 	With Activity: ___ 	[x ] Refer to charted pain scores    THERAPY:    s/p ___2_____ mg PF morphine on __9/3____      MEDICATIONS  (STANDING):  acetaminophen     Tablet .. 975 milliGRAM(s) Oral <User Schedule>  diphtheria/tetanus/pertussis (acellular) Vaccine (ADAcel) 0.5 milliLiter(s) IntraMuscular once  heparin   Injectable 5000 Unit(s) SubCutaneous every 12 hours  ibuprofen  Tablet. 600 milliGRAM(s) Oral every 6 hours  influenza   Vaccine 0.5 milliLiter(s) IntraMuscular once  ketorolac   Injectable 30 milliGRAM(s) IV Push every 6 hours  lactated ringers. 1000 milliLiter(s) (125 mL/Hr) IV Continuous <Continuous>  oxytocin Infusion 333.333 milliUNIT(s)/Min (1000 mL/Hr) IV Continuous <Continuous>    MEDICATIONS  (PRN):  diphenhydrAMINE 25 milliGRAM(s) Oral every 6 hours PRN Pruritus  lanolin Ointment 1 Application(s) Topical every 6 hours PRN Sore Nipples  magnesium hydroxide Suspension 30 milliLiter(s) Oral two times a day PRN Constipation  oxyCODONE    IR 5 milliGRAM(s) Oral every 3 hours PRN Moderate to Severe Pain (4-10)  oxyCODONE    IR 5 milliGRAM(s) Oral once PRN Moderate to Severe Pain (4-10)  simethicone 80 milliGRAM(s) Chew every 4 hours PRN Gas      OBJECTIVE:    Sedation Score:	[x ] Alert	[ ] Drowsy	[ ] Arousable	[ ] Asleep	[ ] Unresponsive    Side Effects:	[x ] None	[ ] Nausea	[ ] Vomiting	[ ] Pruritus  		  [ ] Weakness		[ ] Numbness	[ ] Other:    Vital Signs Last 24 Hrs  T(C): 36.9 (04 Sep 2022 08:13), Max: 37.1 (03 Sep 2022 16:34)  T(F): 98.4 (04 Sep 2022 08:13), Max: 98.8 (03 Sep 2022 18:09)  HR: 80 (04 Sep 2022 08:13) (64 - 100)  BP: 106/68 (04 Sep 2022 08:13) (93/60 - 121/78)  BP(mean): 81 (03 Sep 2022 15:40) (79 - 95)  RR: 18 (04 Sep 2022 08:13) (13 - 25)  SpO2: 96% (04 Sep 2022 08:13) (96% - 99%)    Parameters below as of 04 Sep 2022 08:13  Patient On (Oxygen Delivery Method): room air        ASSESSMENT/ PLAN  [x ] Patient transitioned to prn analgesics  [x ] Pain management per primary service, pain service to sign off   [ ]Documentation and Verification of current medications     Comments: Day _1__ of Anesthesia Pain Management Service    SUBJECTIVE:  Pain Scale Score	At rest: ___ 	With Activity: ___ 	[x ] Refer to charted pain scores    THERAPY:    s/p ___0.1_____ mg PF morphine on __9/3____      MEDICATIONS  (STANDING):  acetaminophen     Tablet .. 975 milliGRAM(s) Oral <User Schedule>  diphtheria/tetanus/pertussis (acellular) Vaccine (ADAcel) 0.5 milliLiter(s) IntraMuscular once  heparin   Injectable 5000 Unit(s) SubCutaneous every 12 hours  ibuprofen  Tablet. 600 milliGRAM(s) Oral every 6 hours  influenza   Vaccine 0.5 milliLiter(s) IntraMuscular once  ketorolac   Injectable 30 milliGRAM(s) IV Push every 6 hours  lactated ringers. 1000 milliLiter(s) (125 mL/Hr) IV Continuous <Continuous>  oxytocin Infusion 333.333 milliUNIT(s)/Min (1000 mL/Hr) IV Continuous <Continuous>    MEDICATIONS  (PRN):  diphenhydrAMINE 25 milliGRAM(s) Oral every 6 hours PRN Pruritus  lanolin Ointment 1 Application(s) Topical every 6 hours PRN Sore Nipples  magnesium hydroxide Suspension 30 milliLiter(s) Oral two times a day PRN Constipation  oxyCODONE    IR 5 milliGRAM(s) Oral every 3 hours PRN Moderate to Severe Pain (4-10)  oxyCODONE    IR 5 milliGRAM(s) Oral once PRN Moderate to Severe Pain (4-10)  simethicone 80 milliGRAM(s) Chew every 4 hours PRN Gas      OBJECTIVE:    Sedation Score:	[x ] Alert	[ ] Drowsy	[ ] Arousable	[ ] Asleep	[ ] Unresponsive    Side Effects:	[x ] None	[ ] Nausea	[ ] Vomiting	[ ] Pruritus  		  [ ] Weakness		[ ] Numbness	[ ] Other:    Vital Signs Last 24 Hrs  T(C): 36.9 (04 Sep 2022 08:13), Max: 37.1 (03 Sep 2022 16:34)  T(F): 98.4 (04 Sep 2022 08:13), Max: 98.8 (03 Sep 2022 18:09)  HR: 80 (04 Sep 2022 08:13) (64 - 100)  BP: 106/68 (04 Sep 2022 08:13) (93/60 - 121/78)  BP(mean): 81 (03 Sep 2022 15:40) (79 - 95)  RR: 18 (04 Sep 2022 08:13) (13 - 25)  SpO2: 96% (04 Sep 2022 08:13) (96% - 99%)    Parameters below as of 04 Sep 2022 08:13  Patient On (Oxygen Delivery Method): room air        ASSESSMENT/ PLAN  [x ] Patient transitioned to prn analgesics  [x ] Pain management per primary service, pain service to sign off   [ ]Documentation and Verification of current medications     Comments:

## 2022-09-04 NOTE — DISCHARGE NOTE OB - CARE PLAN
1 Principal Discharge DX:	Pregnancy  Assessment and plan of treatment:	per pmd   Principal Discharge DX:	Pregnancy  Assessment and plan of treatment:	po pain meds  ambulate   reg diet

## 2022-09-04 NOTE — DISCHARGE NOTE OB - CARE PROVIDER_API CALL
Jet Valentine)  Obstetrics and Gynecology  [address] [place] [address], [address]  [place] [address]  Phone: [phone]  Fax: [phone]  Follow Up Time:

## 2022-09-04 NOTE — PROGRESS NOTE ADULT - SUBJECTIVE AND OBJECTIVE BOX
OB Postpartum Note:  Delivery    S: 26yo now POD #1 s/p pLTCS for breech. Her pain is well controlled. She is tolerating a regular diet but has not yet passed flatus. Voiding spontaneously and ambulating without difficulty. Denies N/V. Denies CP/SOB/lightheadedness/dizziness.     O:   Vitals:  Vital Signs Last 24 Hrs  T(C): 36.7 (04 Sep 2022 06:11), Max: 37.1 (03 Sep 2022 16:34)  T(F): 98.1 (04 Sep 2022 06:11), Max: 98.8 (03 Sep 2022 18:09)  HR: 82 (04 Sep 2022 06:11) (64 - 100)  BP: 107/67 (04 Sep 2022 06:11) (93/60 - 140/83)  BP(mean): 81 (03 Sep 2022 15:40) (79 - 95)  RR: 18 (04 Sep 2022 06:11) (13 - 25)  SpO2: 97% (04 Sep 2022 06:11) (92% - 99%)    Parameters below as of 04 Sep 2022 06:11  Patient On (Oxygen Delivery Method): room air        MEDICATIONS  (STANDING):  acetaminophen     Tablet .. 975 milliGRAM(s) Oral <User Schedule>  diphtheria/tetanus/pertussis (acellular) Vaccine (ADAcel) 0.5 milliLiter(s) IntraMuscular once  heparin   Injectable 5000 Unit(s) SubCutaneous every 12 hours  ibuprofen  Tablet. 600 milliGRAM(s) Oral every 6 hours  influenza   Vaccine 0.5 milliLiter(s) IntraMuscular once  ketorolac   Injectable 30 milliGRAM(s) IV Push every 6 hours  lactated ringers. 1000 milliLiter(s) (125 mL/Hr) IV Continuous <Continuous>  morphine PF Spinal 0.1 milliGRAM(s) IntraThecal. once  oxytocin Infusion 333.333 milliUNIT(s)/Min (1000 mL/Hr) IV Continuous <Continuous>    MEDICATIONS  (PRN):  dexAMETHasone  Injectable 4 milliGRAM(s) IV Push every 6 hours PRN Nausea  diphenhydrAMINE 25 milliGRAM(s) Oral every 6 hours PRN Pruritus  lanolin Ointment 1 Application(s) Topical every 6 hours PRN Sore Nipples  magnesium hydroxide Suspension 30 milliLiter(s) Oral two times a day PRN Constipation  nalbuphine Injectable 2.5 milliGRAM(s) IV Push every 6 hours PRN Pruritus  naloxone Injectable 0.1 milliGRAM(s) IV Push every 3 minutes PRN For ANY of the following changes in patient status:  A. Breaths Per Minute LESS THAN 10, B. Oxygen saturation LESS THAN 90%, C. Sedation score of 6 for Stop After: 4 Times  ondansetron Injectable 4 milliGRAM(s) IV Push every 6 hours PRN Nausea  oxyCODONE    IR 5 milliGRAM(s) Oral every 3 hours PRN Moderate to Severe Pain (4-10)  oxyCODONE    IR 5 milliGRAM(s) Oral once PRN Moderate to Severe Pain (4-10)  simethicone 80 milliGRAM(s) Chew every 4 hours PRN Gas      Labs:  Blood type: A Positive  Rubella IgG: RPR: Negative                          12.1   17.44<H> >-----------< 178    (  @ 06:21 )             36.2                        14.1   10.49 >-----------< 187    (  @ 09:10 )             42.5    22 @ 09:29      137  |  104  |  11  ----------------------------<  86  3.9   |  21<L>  |  0.50        Ca    9.3      03 Sep 2022 09:29    TPro  7.0  /  Alb  4.0  /  TBili  0.4  /  DBili  x   /  AST  15  /  ALT  14  /  AlkPhos  143<H>  22 @ 09:29          PE:  General: NAD, patient resting comfortably in bed  Abdomen: Mildly distended, appropriately tender  Incision: Clean, dry, intact.  Extremities: SCDs in place, no erythema

## 2022-09-05 VITALS
DIASTOLIC BLOOD PRESSURE: 74 MMHG | HEART RATE: 83 BPM | RESPIRATION RATE: 18 BRPM | SYSTOLIC BLOOD PRESSURE: 111 MMHG | OXYGEN SATURATION: 95 % | TEMPERATURE: 98 F

## 2022-09-05 PROCEDURE — 86901 BLOOD TYPING SEROLOGIC RH(D): CPT

## 2022-09-05 PROCEDURE — 90686 IIV4 VACC NO PRSV 0.5 ML IM: CPT

## 2022-09-05 PROCEDURE — 84550 ASSAY OF BLOOD/URIC ACID: CPT

## 2022-09-05 PROCEDURE — 85610 PROTHROMBIN TIME: CPT

## 2022-09-05 PROCEDURE — 86850 RBC ANTIBODY SCREEN: CPT

## 2022-09-05 PROCEDURE — 85730 THROMBOPLASTIN TIME PARTIAL: CPT

## 2022-09-05 PROCEDURE — 80053 COMPREHEN METABOLIC PANEL: CPT

## 2022-09-05 PROCEDURE — 85384 FIBRINOGEN ACTIVITY: CPT

## 2022-09-05 PROCEDURE — 85025 COMPLETE CBC W/AUTO DIFF WBC: CPT

## 2022-09-05 PROCEDURE — C1765: CPT

## 2022-09-05 PROCEDURE — 59025 FETAL NON-STRESS TEST: CPT

## 2022-09-05 PROCEDURE — G0463: CPT

## 2022-09-05 PROCEDURE — 83615 LACTATE (LD) (LDH) ENZYME: CPT

## 2022-09-05 PROCEDURE — 86780 TREPONEMA PALLIDUM: CPT

## 2022-09-05 PROCEDURE — 86900 BLOOD TYPING SEROLOGIC ABO: CPT

## 2022-09-05 PROCEDURE — 59050 FETAL MONITOR W/REPORT: CPT

## 2022-09-05 PROCEDURE — 36415 COLL VENOUS BLD VENIPUNCTURE: CPT

## 2022-09-05 PROCEDURE — 86769 SARS-COV-2 COVID-19 ANTIBODY: CPT

## 2022-09-05 RX ORDER — IBUPROFEN 200 MG
1 TABLET ORAL
Qty: 0 | Refills: 0 | DISCHARGE
Start: 2022-09-05

## 2022-09-05 RX ORDER — OXYCODONE HYDROCHLORIDE 5 MG/1
1 TABLET ORAL
Qty: 12 | Refills: 0
Start: 2022-09-05 | End: 2022-09-06

## 2022-09-05 RX ORDER — ACETAMINOPHEN 500 MG
2 TABLET ORAL
Qty: 0 | Refills: 0 | DISCHARGE
Start: 2022-09-05

## 2022-09-05 RX ORDER — OXYCODONE HYDROCHLORIDE 5 MG/1
5 TABLET ORAL
Refills: 0 | Status: DISCONTINUED | OUTPATIENT
Start: 2022-09-05 | End: 2022-09-05

## 2022-09-05 RX ADMIN — Medication 600 MILLIGRAM(S): at 05:41

## 2022-09-05 RX ADMIN — OXYCODONE HYDROCHLORIDE 5 MILLIGRAM(S): 5 TABLET ORAL at 11:10

## 2022-09-05 RX ADMIN — Medication 600 MILLIGRAM(S): at 01:00

## 2022-09-05 RX ADMIN — Medication 975 MILLIGRAM(S): at 04:00

## 2022-09-05 RX ADMIN — HEPARIN SODIUM 5000 UNIT(S): 5000 INJECTION INTRAVENOUS; SUBCUTANEOUS at 05:41

## 2022-09-05 RX ADMIN — Medication 975 MILLIGRAM(S): at 03:08

## 2022-09-05 RX ADMIN — Medication 600 MILLIGRAM(S): at 12:50

## 2022-09-05 RX ADMIN — Medication 600 MILLIGRAM(S): at 11:50

## 2022-09-05 RX ADMIN — Medication 600 MILLIGRAM(S): at 06:49

## 2022-09-05 RX ADMIN — OXYCODONE HYDROCHLORIDE 5 MILLIGRAM(S): 5 TABLET ORAL at 12:10

## 2022-09-05 RX ADMIN — Medication 975 MILLIGRAM(S): at 08:32

## 2022-09-05 RX ADMIN — Medication 975 MILLIGRAM(S): at 09:32

## 2022-09-05 NOTE — PROGRESS NOTE ADULT - SUBJECTIVE AND OBJECTIVE BOX
OB Progress Note: LTCS, POD#2    S: 27y POD#2 s/p pLTCS for breech. Pain is well controlled. She is tolerating a regular diet and passing flatus. She is voiding spontaneously, and ambulating without difficulty. Denies CP/SOB. Denies lightheadedness/dizziness. Denies N/V.    O:  Vitals:  Vital Signs Last 24 Hrs  T(C): 36.9 (04 Sep 2022 21:25), Max: 36.9 (04 Sep 2022 08:13)  T(F): 98.4 (04 Sep 2022 21:25), Max: 98.5 (04 Sep 2022 13:25)  HR: 98 (04 Sep 2022 21:25) (80 - 98)  BP: 97/63 (04 Sep 2022 21:25) (95/65 - 107/67)  BP(mean): --  RR: 18 (04 Sep 2022 21:25) (18 - 18)  SpO2: 97% (04 Sep 2022 21:25) (96% - 97%)    Parameters below as of 04 Sep 2022 13:25  Patient On (Oxygen Delivery Method): room air        MEDICATIONS  (STANDING):  acetaminophen     Tablet .. 975 milliGRAM(s) Oral <User Schedule>  diphtheria/tetanus/pertussis (acellular) Vaccine (ADAcel) 0.5 milliLiter(s) IntraMuscular once  heparin   Injectable 5000 Unit(s) SubCutaneous every 12 hours  ibuprofen  Tablet. 600 milliGRAM(s) Oral every 6 hours  ketorolac   Injectable 30 milliGRAM(s) IV Push every 6 hours  lactated ringers. 1000 milliLiter(s) (125 mL/Hr) IV Continuous <Continuous>  oxytocin Infusion 333.333 milliUNIT(s)/Min (1000 mL/Hr) IV Continuous <Continuous>      MEDICATIONS  (PRN):  diphenhydrAMINE 25 milliGRAM(s) Oral every 6 hours PRN Pruritus  lanolin Ointment 1 Application(s) Topical every 6 hours PRN Sore Nipples  magnesium hydroxide Suspension 30 milliLiter(s) Oral two times a day PRN Constipation  oxyCODONE    IR 5 milliGRAM(s) Oral every 3 hours PRN Moderate to Severe Pain (4-10)  oxyCODONE    IR 5 milliGRAM(s) Oral once PRN Moderate to Severe Pain (4-10)  simethicone 80 milliGRAM(s) Chew every 4 hours PRN Gas      Labs:  Blood type: A Positive  Rubella IgG: RPR: Negative                          12.1   17.44<H> >-----------< 178    ( 09-04 @ 06:21 )             36.2                        14.1   10.49 >-----------< 187    ( 09-03 @ 09:10 )             42.5    09-03-22 @ 09:29      137  |  104  |  11  ----------------------------<  86  3.9   |  21<L>  |  0.50        Ca    9.3      03 Sep 2022 09:29    TPro  7.0  /  Alb  4.0  /  TBili  0.4  /  DBili  x   /  AST  15  /  ALT  14  /  AlkPhos  143<H>  09-03-22 @ 09:29          PE:  General: NAD. Resting in bed prior to eval   Pulm: Unlabored breathing. No respiratory distress  Abdomen: Incision c/d/i w/ overlying dermabond prineo. Non-tender. Soft abdomen   Extremities: No calf tenderness bilaterally

## 2022-09-05 NOTE — PROGRESS NOTE ADULT - ASSESSMENT
A/P: 27y POD#2  s/p pLTCS for breech. Patient is progressing appropriately post-operatively   - Continue regular diet.  - Encourage ambulation  - Continue motrin, tylenol, oxycodone PRN for pain control    Cheko Hernandez, PGY-1  Obstetrics and Gynecology
A/P: 28yo POD #1 s/p pLTCS for breech.  Patient is stable and doing well post-operatively.      #Postpartum recovery from  section  - Continue regular diet.  - Increase ambulation.  - PO pain medication with Tylenol, Motrin and Oxycodone PRN for pain control.    - POD #1 CBC this morning.   - DVT prophylaxis with Heparin 5000u BID    Rianna Cote PGY1

## 2022-09-05 NOTE — PROGRESS NOTE ADULT - ATTENDING COMMENTS
Patient seen and examined.  Agree with above.    stable for d/c  Regular diet  ambulate  po pain meds    Norma Gonsalez MD  OB attg
agree with above note  cont present mgmt  t susan stone

## 2023-05-23 NOTE — OB RN INTRAOPERATIVE NOTE - NS_DELIVERYRN_OBGYN_ALL_OB_FT
Outreach attempts to coordinate scheduling on the patient's Service to Behavioral Health order in workqueue [id number] requested on 5/23/2023 have been conducted.    The order has been removed from the workqueue but is still valid for one year from date the order was placed. No additional orders will need to be placed for this request. Patient may call Central Scheduling at [phone] and we would be happy to assist them.    
Carmen Bullock RN

## 2023-09-10 ENCOUNTER — RESULT REVIEW (OUTPATIENT)
Age: 28
End: 2023-09-10

## 2023-09-11 ENCOUNTER — APPOINTMENT (OUTPATIENT)
Dept: OBGYN | Facility: CLINIC | Age: 28
End: 2023-09-11
Payer: COMMERCIAL

## 2023-09-11 PROCEDURE — 99395 PREV VISIT EST AGE 18-39: CPT

## 2023-09-25 ENCOUNTER — TRANSCRIPTION ENCOUNTER (OUTPATIENT)
Age: 28
End: 2023-09-25

## 2025-02-27 ENCOUNTER — RESULT REVIEW (OUTPATIENT)
Age: 30
End: 2025-02-27

## 2025-02-27 ENCOUNTER — APPOINTMENT (OUTPATIENT)
Dept: OBGYN | Facility: CLINIC | Age: 30
End: 2025-02-27
Payer: COMMERCIAL

## 2025-02-27 PROCEDURE — 99395 PREV VISIT EST AGE 18-39: CPT

## 2025-02-27 PROCEDURE — 96127 BRIEF EMOTIONAL/BEHAV ASSMT: CPT

## 2025-04-09 NOTE — OB PST NOTE - BRAND OF FIRST COVID-19 BOOSTER
[employer]
daughter and son in law and grandchildren, she will be alone at periods of time during the day

## 2025-08-19 ENCOUNTER — NON-APPOINTMENT (OUTPATIENT)
Age: 30
End: 2025-08-19

## 2025-08-19 ENCOUNTER — APPOINTMENT (OUTPATIENT)
Dept: CARDIOLOGY | Facility: CLINIC | Age: 30
End: 2025-08-19
Payer: COMMERCIAL

## 2025-08-19 VITALS
RESPIRATION RATE: 14 BRPM | BODY MASS INDEX: 21.17 KG/M2 | SYSTOLIC BLOOD PRESSURE: 118 MMHG | HEART RATE: 97 BPM | WEIGHT: 124 LBS | HEIGHT: 64 IN | DIASTOLIC BLOOD PRESSURE: 82 MMHG | OXYGEN SATURATION: 97 %

## 2025-08-19 DIAGNOSIS — R53.82 CHRONIC FATIGUE, UNSPECIFIED: ICD-10-CM

## 2025-08-19 DIAGNOSIS — Z82.49 FAMILY HISTORY OF ISCHEMIC HEART DISEASE AND OTHER DISEASES OF THE CIRCULATORY SYSTEM: ICD-10-CM

## 2025-08-19 DIAGNOSIS — Z78.9 OTHER SPECIFIED HEALTH STATUS: ICD-10-CM

## 2025-08-19 DIAGNOSIS — U07.1 COVID-19: ICD-10-CM

## 2025-08-19 DIAGNOSIS — R00.0 TACHYCARDIA, UNSPECIFIED: ICD-10-CM

## 2025-08-19 DIAGNOSIS — R00.2 PALPITATIONS: ICD-10-CM

## 2025-08-19 PROCEDURE — 93000 ELECTROCARDIOGRAM COMPLETE: CPT

## 2025-08-19 PROCEDURE — 99245 OFF/OP CONSLTJ NEW/EST HI 55: CPT

## 2025-09-17 ENCOUNTER — APPOINTMENT (OUTPATIENT)
Dept: CARDIOLOGY | Facility: CLINIC | Age: 30
End: 2025-09-17
Payer: COMMERCIAL

## 2025-09-17 VITALS
SYSTOLIC BLOOD PRESSURE: 120 MMHG | RESPIRATION RATE: 15 BRPM | HEART RATE: 90 BPM | DIASTOLIC BLOOD PRESSURE: 75 MMHG | BODY MASS INDEX: 21 KG/M2 | HEIGHT: 64 IN | WEIGHT: 123 LBS

## 2025-09-17 DIAGNOSIS — R00.2 PALPITATIONS: ICD-10-CM

## 2025-09-17 DIAGNOSIS — R00.0 TACHYCARDIA, UNSPECIFIED: ICD-10-CM

## 2025-09-17 PROCEDURE — G2211 COMPLEX E/M VISIT ADD ON: CPT

## 2025-09-17 PROCEDURE — 99213 OFFICE O/P EST LOW 20 MIN: CPT

## 2025-09-17 PROCEDURE — 93306 TTE W/DOPPLER COMPLETE: CPT
